# Patient Record
Sex: FEMALE | Race: OTHER | NOT HISPANIC OR LATINO
[De-identification: names, ages, dates, MRNs, and addresses within clinical notes are randomized per-mention and may not be internally consistent; named-entity substitution may affect disease eponyms.]

---
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
## 2024-01-17 ENCOUNTER — APPOINTMENT (OUTPATIENT)
Dept: DERMATOLOGY | Facility: CLINIC | Age: 19
End: 2024-01-17

## 2024-01-17 NOTE — PHYSICAL EXAM
[FreeTextEntry3] : Focused exam only (see below) per patient request:\par  \par  decreased hair density on frontal and crown of scalp extending onto parietotemporal scalp (improved from last visit) w/ miniaturization on dermoscopy\par  neg hair pull test\par  minimal loose scale

## 2024-01-17 NOTE — HISTORY OF PRESENT ILLNESS
[FreeTextEntry1] : fuv: hair loss [de-identified] : Ms. YAZAN JETT is a 18 year old F here for f/u of below.  Last seen March 2023  Problem: hairloss Location: frontal/crown scalp Duration: since 4th grade Associated symptoms/Aggravating Factors:  Hair loss started with patch that only had a few hairs remaining. Progressively got worse.  - Pt denies a family hx of alopecia.  Modifying factors/Treatments:  - 10/11/21: Pt have been using coconut oil since one week ago with minimal improvement . Pt reports went to an endocrinology recently which ordered hormonal levels but pt have not  perform labs yet. Pt reports irregular menstrual periods since menarche and hirsutism in the abdominal area.  -10/27/21: Biopsy performed at , here to discuss results. pt reports taking vitamin D.  - 04/13/2022: reports after ILK-5 injections at , saw some hair growth but unsure if improved; also using topical mometasone and ketoconazole shampoo 3x/week. Reports taking vitamin D but unsure of dose - 06/15/2022: reports regrowth, using rogaine 5% solution, once to twice per week, not using more frequently due to it making her scalp more itchy/flaky. S/p 15d course of pred  with mild improvement. Taking vit D supplement.  - 3/22/23: ILK at last visit. using rogaine nightly unless itchy, then every other night. Does feel seb derm is worse at times, using keto 2X/week. improved growth of hair.  Of note, saw endocrinology for irregular periods occasionally, recommended to start OCPs but patient and specifically mom hesitant. - 1/2024:   Personal hx of skin cancer: no FHx of skin cancer: no Social Hx: here with mother

## 2024-01-23 ENCOUNTER — APPOINTMENT (OUTPATIENT)
Dept: DERMATOLOGY | Facility: CLINIC | Age: 19
End: 2024-01-23

## 2024-03-19 ENCOUNTER — APPOINTMENT (OUTPATIENT)
Dept: DERMATOLOGY | Facility: CLINIC | Age: 19
End: 2024-03-19

## 2024-09-09 ENCOUNTER — APPOINTMENT (OUTPATIENT)
Dept: DERMATOLOGY | Facility: CLINIC | Age: 19
End: 2024-09-09
Payer: COMMERCIAL

## 2024-09-09 DIAGNOSIS — L63.9 ALOPECIA AREATA, UNSPECIFIED: ICD-10-CM

## 2024-09-09 DIAGNOSIS — L21.9 SEBORRHEIC DERMATITIS, UNSPECIFIED: ICD-10-CM

## 2024-09-09 PROCEDURE — 99214 OFFICE O/P EST MOD 30 MIN: CPT | Mod: 95

## 2024-09-09 NOTE — HISTORY OF PRESENT ILLNESS
[FreeTextEntry1] : fuv: hair loss [de-identified] : Ms. YAZAN JETT is a 19 year old F here for f/u of below.  Last seen March 2023- feels mometasone solution is helping with hair loss. However, now flaring/lots of hair loss off of it (ran out). No longer uses rogaine since she has PCOS and her gyn recommended not use it. Also ran out of keto shampo  Hx:  Problem: hairloss Location: frontal/crown scalp Duration: since 4th grade Associated symptoms/Aggravating Factors:  Hair loss started with patch that only had a few hairs remaining. Progressively got worse.  - Pt denies a family hx of alopecia.  Modifying factors/Treatments:  - 10/11/21: Pt have been using coconut oil since one week ago with minimal improvement . Pt reports went to an endocrinology recently which ordered hormonal levels but pt have not  perform labs yet. Pt reports irregular menstrual periods since menarche and hirsutism in the abdominal area.  -10/27/21: Biopsy performed at , here to discuss results. pt reports taking vitamin D.  - 04/13/2022: reports after ILK-5 injections at , saw some hair growth but unsure if improved; also using topical mometasone and ketoconazole shampoo 3x/week. Reports taking vitamin D but unsure of dose - 06/15/2022: reports regrowth, using rogaine 5% solution, once to twice per week, not using more frequently due to it making her scalp more itchy/flaky. S/p 15d course of pred  with mild improvement. Taking vit D supplement.  - 3/22/23: ILK at last visit. using rogaine nightly unless itchy, then every other night. Does feel seb derm is worse at times, using keto 2X/week. improved growth of hair.  Of note, saw endocrinology for irregular periods occasionally, recommended to start OCPs but patient and specifically mom hesitant.  No other changing or concerning lesions.  No itchy, growing, bleeding, painful, or changing moles.   Personal hx of skin cancer: no FHx of skin cancer: no Social Hx: here with mother

## 2024-09-09 NOTE — ASSESSMENT
[FreeTextEntry1] : # Alopecia/Hair loss, diffuse pattern - chronic; flaring s/p ILK-5  in Oct 2021 & June 2022 with improvement (see photos in chart). Also s/p 3 week taper of PO prednisone initially.  **Punch biopsy 10/11/21 on crown of scalp suggestive of alopecia areata. Clinically not very consistent for typical alopecia areata, though may be diffuse pattern. Reviewed biopsy with Dr. Tomlinson; shows peribulbar inflammation with pigment incontinence and miniaturization support diagnosis of alopecia areata. No evidence of scarring alopecia. PAS negative.**  - patient hesitant to restart rogaine as gyn recommended she not use it.  - restart mometasone solution three times a week, SED, do not get on face - previously had ILK injections but not sure it helped - rtc for eval in person  # Seborrheic dermatitis, scalp - chronic; stable - ketoconazole shampoo 2-3x weekly, patient instructed on proper use.  - mometasone solution 3 nights per week as above, SED.   RTC for in person appt  This visit was conducted via live synchronous audio/video telehealth with the patient and provider. The patient attested to being located in [facility] where the provider is also currently located and licensed to practice medicine. Verbal consent was obtained for this telemedicine encounter. Risks and benefits of using Telehealth services has been discussed with the patient. The patient has been given ample opportunity to discuss any questions regarding [facility]'s Telehealth services. All of the patient's questions were answered to his/her satisfaction.

## 2024-09-09 NOTE — PHYSICAL EXAM
Check BP with your doctor. Low salt and weight loss. [Alert] : alert [FreeTextEntry3] : Focused exam only (see below) per patient request:  diffuse decreased hair density on crown of scalp

## 2024-10-14 ENCOUNTER — APPOINTMENT (OUTPATIENT)
Dept: DERMATOLOGY | Facility: CLINIC | Age: 19
End: 2024-10-14
Payer: COMMERCIAL

## 2024-10-14 DIAGNOSIS — L63.9 ALOPECIA AREATA, UNSPECIFIED: ICD-10-CM

## 2024-10-14 PROCEDURE — 99214 OFFICE O/P EST MOD 30 MIN: CPT | Mod: 95

## 2024-10-14 RX ORDER — DEXAMETHASONE 2 MG/1
2 TABLET ORAL
Qty: 24 | Refills: 0 | Status: ACTIVE | COMMUNITY
Start: 2024-10-14 | End: 1900-01-01

## 2024-10-18 RX ORDER — BARICITINIB 2 MG/1
2 TABLET, FILM COATED ORAL
Qty: 30 | Refills: 0 | Status: ACTIVE | COMMUNITY
Start: 2024-10-18 | End: 1900-01-01

## 2024-11-29 ENCOUNTER — APPOINTMENT (OUTPATIENT)
Dept: DERMATOLOGY | Facility: CLINIC | Age: 19
End: 2024-11-29
Payer: COMMERCIAL

## 2024-11-29 ENCOUNTER — NON-APPOINTMENT (OUTPATIENT)
Age: 19
End: 2024-11-29

## 2024-11-29 DIAGNOSIS — L63.9 ALOPECIA AREATA, UNSPECIFIED: ICD-10-CM

## 2024-11-29 DIAGNOSIS — L65.9 NONSCARRING HAIR LOSS, UNSPECIFIED: ICD-10-CM

## 2024-11-29 PROCEDURE — 99214 OFFICE O/P EST MOD 30 MIN: CPT

## 2024-12-03 ENCOUNTER — NON-APPOINTMENT (OUTPATIENT)
Age: 19
End: 2024-12-03

## 2025-01-16 ENCOUNTER — APPOINTMENT (OUTPATIENT)
Dept: DERMATOLOGY | Facility: CLINIC | Age: 20
End: 2025-01-16